# Patient Record
(demographics unavailable — no encounter records)

---

## 2025-01-27 NOTE — CONSULT LETTER
[Dear  ___] : Dear  [unfilled], [Consult Letter:] : I had the pleasure of evaluating your patient, [unfilled]. [( Thank you for referring [unfilled] for consultation for _____ )] : Thank you for referring [unfilled] for consultation for [unfilled] [Please see my note below.] : Please see my note below. [Consult Closing:] : Thank you very much for allowing me to participate in the care of this patient.  If you have any questions, please do not hesitate to contact me. [Sincerely,] : Sincerely, [FreeTextEntry3] : Sravani cordero

## 2025-01-27 NOTE — DATA REVIEWED
[FreeTextEntry1] : 1/2024: TSH 4.53  T4 10.8   6/24/: TSH 3.29  Ft4 1.41 A1c 5.5 %  T4 8.7  prolactin 8.5   1/2025: TSH  2.92  FT4 1.2  TPO 12.4

## 2025-01-27 NOTE — HISTORY OF PRESENT ILLNESS
[FreeTextEntry1] : 81 year old pleasant with CAD anxiety, depression , who present for follow up  evaluation of hypothyroidism     # hypothyroidism  - per patient diagnosed few years ago  - on Synthroid 125 mcg daily  - 1/2024: TSH elevated , T4 also elevated prolactin normal on Lt4 125 mcg daily  - 6/2024: TSh and Ft4 and t4 normal remain on same dose , clinically euthyroid  - 1/2025: was on levothyroxine 125 mcg daily , he reports past month did not have it ?? TFts normal   # DL : on lipitor 20 mg daily

## 2025-01-27 NOTE — ASSESSMENT
[FreeTextEntry1] : 81 year old pleasant with CAD anxiety, depression , who present for follow up evaluation of hypothyroidism    # hypothyroidism - per patient diagnosed few years ago - on Synthroid 125 mcg daily - 1/2024: TSH elevated , T4 also elevated prolactin normal on Lt4 125 mcg daily - 6/2024: TSh and Ft4 and t4 normal remain on same dose , clinically euthyroid - 1/2025: was on levothyroxine 125 mcg daily , he reports past month did not have it ?? TFts normal tfts normal despite per patient skipping 1month ? , will lower his dose to 112 mcg daily and redo labs in 4 months and adjust if needed    # DL : on lipitor 20 mg daily

## 2025-01-27 NOTE — PHYSICAL EXAM
[Alert] : alert [Healthy Appearance] : healthy appearance [No Acute Distress] : no acute distress [No Proptosis] : no proptosis [No Lid Lag] : no lid lag [Thyroid Not Enlarged] : the thyroid was not enlarged [No Respiratory Distress] : no respiratory distress [No Accessory Muscle Use] : no accessory muscle use [Clear to Auscultation] : lungs were clear to auscultation bilaterally [Normal S1, S2] : normal S1 and S2 [No Murmurs] : no murmurs [Regular Rhythm] : with a regular rhythm [No Edema] : no peripheral edema [No Stigmata of Cushings Syndrome] : no stigmata of Cushings Syndrome [No Tremors] : no tremors [Oriented x3] : oriented to person, place, and time

## 2025-06-02 NOTE — DATA REVIEWED
[FreeTextEntry1] : 1/2024: TSH 4.53  T4 10.8   6/24/: TSH 3.29  Ft4 1.41 A1c 5.5 %  T4 8.7  prolactin 8.5   1/2025: TSH  2.92  FT4 1.2  TPO 12.4  5/2025:  TSH 3.38   ft4 1.3  glucose 125 crea 1.2  GFR 61   ldl 97

## 2025-06-02 NOTE — ASSESSMENT
[FreeTextEntry1] : 81 year old pleasant with CAD anxiety, depression , who present for follow up evaluation of hypothyroidism    # hypothyroidism - per patient diagnosed few years ago - on Synthroid 125 mcg daily - 1/2024: TSH elevated , T4 also elevated prolactin normal on Lt4 125 mcg daily - 6/2024: TSh and Ft4 and t4 normal remain on same dose , clinically euthyroid - 1/2025: was on levothyroxine 125 mcg daily , he reports past month did not have it ?? TFts normal - 5/2025: TSH normal while on Lt4 112 mcg daily , feels ok no complaints, labs were non fasting glucose 125 and Tg mild elevation  continue Lt4 112 mcg daily , compliance advised  can be followed yearly    # DL : on lipitor 20 mg daily

## 2025-06-02 NOTE — HISTORY OF PRESENT ILLNESS
[FreeTextEntry1] : 81 year old pleasant with CAD anxiety, depression , who present for follow up  evaluation of hypothyroidism     # hypothyroidism  - per patient diagnosed few years ago  - on Synthroid 125 mcg daily  - 1/2024: TSH elevated , T4 also elevated prolactin normal on Lt4 125 mcg daily  - 6/2024: TSh and Ft4 and t4 normal remain on same dose , clinically euthyroid  - 1/2025: was on levothyroxine 125 mcg daily , he reports past month did not have it ?? TFts normal  - 5/2025: TSH normal while on Lt4 112 mcg daily , feels ok no  complaints, labs were non fasting glucose 125 and Tg mild elevation    # DL : on lipitor 20 mg daily